# Patient Record
Sex: FEMALE | Race: WHITE | Employment: FULL TIME | URBAN - METROPOLITAN AREA
[De-identification: names, ages, dates, MRNs, and addresses within clinical notes are randomized per-mention and may not be internally consistent; named-entity substitution may affect disease eponyms.]

---

## 2021-01-12 ENCOUNTER — APPOINTMENT (OUTPATIENT)
Dept: ULTRASOUND IMAGING | Age: 31
End: 2021-01-12

## 2021-01-12 ENCOUNTER — HOSPITAL ENCOUNTER (EMERGENCY)
Age: 31
Discharge: HOME | End: 2021-01-13

## 2021-01-12 DIAGNOSIS — B37.31 VAGINAL YEAST INFECTION: ICD-10-CM

## 2021-01-12 DIAGNOSIS — Z34.91 FIRST TRIMESTER PREGNANCY: Primary | ICD-10-CM

## 2021-01-12 DIAGNOSIS — O26.891 ABDOMINAL PAIN IN PREGNANCY, FIRST TRIMESTER: ICD-10-CM

## 2021-01-12 DIAGNOSIS — R10.9 ABDOMINAL PAIN IN PREGNANCY, FIRST TRIMESTER: ICD-10-CM

## 2021-01-12 LAB
ABO/RH: NORMAL
ANION GAP SERPL CALCULATED.3IONS-SCNC: 13 MMOL/L (ref 3–16)
ANISOCYTOSIS: ABNORMAL
BACTERIA WET PREP: ABNORMAL
BACTERIA: ABNORMAL /HPF
BASOPHILS ABSOLUTE: 0 K/UL (ref 0–0.2)
BASOPHILS RELATIVE PERCENT: 0.5 %
BILIRUBIN URINE: NEGATIVE
BLOOD, URINE: NEGATIVE
BUN BLDV-MCNC: 8 MG/DL (ref 7–20)
CALCIUM SERPL-MCNC: 9.2 MG/DL (ref 8.3–10.6)
CHLORIDE BLD-SCNC: 102 MMOL/L (ref 99–110)
CLARITY: ABNORMAL
CLUE CELLS: ABNORMAL
CO2: 21 MMOL/L (ref 21–32)
COLOR: YELLOW
CREAT SERPL-MCNC: <0.5 MG/DL (ref 0.6–1.1)
EOSINOPHILS ABSOLUTE: 0.1 K/UL (ref 0–0.6)
EOSINOPHILS RELATIVE PERCENT: 0.8 %
EPITHELIAL CELLS WET PREP: ABNORMAL
EPITHELIAL CELLS, UA: 7 /HPF (ref 0–5)
GFR AFRICAN AMERICAN: >60
GFR NON-AFRICAN AMERICAN: >60
GLUCOSE BLD-MCNC: 84 MG/DL (ref 70–99)
GLUCOSE URINE: NEGATIVE MG/DL
GONADOTROPIN, CHORIONIC (HCG) QUANT: NORMAL MIU/ML
HCT VFR BLD CALC: 29.6 % (ref 36–48)
HEMATOLOGY PATH CONSULT: YES
HEMOGLOBIN: 8.7 G/DL (ref 12–16)
HYALINE CASTS: 1 /LPF (ref 0–8)
HYPOCHROMIA: ABNORMAL
KETONES, URINE: NEGATIVE MG/DL
LEUKOCYTE ESTERASE, URINE: NEGATIVE
LYMPHOCYTES ABSOLUTE: 1.6 K/UL (ref 1–5.1)
LYMPHOCYTES RELATIVE PERCENT: 21 %
MCH RBC QN AUTO: 18.8 PG (ref 26–34)
MCHC RBC AUTO-ENTMCNC: 29.6 G/DL (ref 31–36)
MCV RBC AUTO: 63.7 FL (ref 80–100)
MICROCYTES: ABNORMAL
MICROSCOPIC EXAMINATION: YES
MONOCYTES ABSOLUTE: 0.5 K/UL (ref 0–1.3)
MONOCYTES RELATIVE PERCENT: 6.9 %
NEUTROPHILS ABSOLUTE: 5.3 K/UL (ref 1.7–7.7)
NEUTROPHILS RELATIVE PERCENT: 70.8 %
NITRITE, URINE: NEGATIVE
OVALOCYTES: ABNORMAL
PDW BLD-RTO: 18 % (ref 12.4–15.4)
PH UA: 7.5 (ref 5–8)
PLATELET # BLD: 342 K/UL (ref 135–450)
PMV BLD AUTO: 8.3 FL (ref 5–10.5)
POLYCHROMASIA: ABNORMAL
POTASSIUM REFLEX MAGNESIUM: 4 MMOL/L (ref 3.5–5.1)
PROTEIN UA: NEGATIVE MG/DL
RBC # BLD: 4.64 M/UL (ref 4–5.2)
RBC UA: 1 /HPF (ref 0–4)
RBC WET PREP: ABNORMAL
SODIUM BLD-SCNC: 136 MMOL/L (ref 136–145)
SOURCE WET PREP: ABNORMAL
SPECIFIC GRAVITY UA: 1.02 (ref 1–1.03)
TRICHOMONAS PREP: ABNORMAL
URINE REFLEX TO CULTURE: ABNORMAL
URINE TYPE: ABNORMAL
UROBILINOGEN, URINE: 0.2 E.U./DL
WBC # BLD: 7.5 K/UL (ref 4–11)
WBC UA: 4 /HPF (ref 0–5)
WBC WET PREP: ABNORMAL
YEAST WET PREP: ABNORMAL

## 2021-01-12 PROCEDURE — 84702 CHORIONIC GONADOTROPIN TEST: CPT

## 2021-01-12 PROCEDURE — 85025 COMPLETE CBC W/AUTO DIFF WBC: CPT

## 2021-01-12 PROCEDURE — 87591 N.GONORRHOEAE DNA AMP PROB: CPT

## 2021-01-12 PROCEDURE — 87491 CHLMYD TRACH DNA AMP PROBE: CPT

## 2021-01-12 PROCEDURE — 76801 OB US < 14 WKS SINGLE FETUS: CPT

## 2021-01-12 PROCEDURE — 86901 BLOOD TYPING SEROLOGIC RH(D): CPT

## 2021-01-12 PROCEDURE — 81001 URINALYSIS AUTO W/SCOPE: CPT

## 2021-01-12 PROCEDURE — 99283 EMERGENCY DEPT VISIT LOW MDM: CPT

## 2021-01-12 PROCEDURE — 80048 BASIC METABOLIC PNL TOTAL CA: CPT

## 2021-01-12 PROCEDURE — 86900 BLOOD TYPING SEROLOGIC ABO: CPT

## 2021-01-12 PROCEDURE — 6370000000 HC RX 637 (ALT 250 FOR IP): Performed by: NURSE PRACTITIONER

## 2021-01-12 PROCEDURE — 87210 SMEAR WET MOUNT SALINE/INK: CPT

## 2021-01-12 RX ORDER — ACETAMINOPHEN 500 MG
1000 TABLET ORAL ONCE
Status: COMPLETED | OUTPATIENT
Start: 2021-01-12 | End: 2021-01-12

## 2021-01-12 RX ADMIN — ACETAMINOPHEN 1000 MG: 500 TABLET ORAL at 22:30

## 2021-01-12 ASSESSMENT — PAIN SCALES - GENERAL
PAINLEVEL_OUTOF10: 7
PAINLEVEL_OUTOF10: 9

## 2021-01-12 ASSESSMENT — ENCOUNTER SYMPTOMS
SHORTNESS OF BREATH: 0
WHEEZING: 0
COLOR CHANGE: 0
ABDOMINAL PAIN: 1
COUGH: 0
BACK PAIN: 0
VOMITING: 0
DIARRHEA: 0
NAUSEA: 0

## 2021-01-12 ASSESSMENT — PAIN DESCRIPTION - PROGRESSION: CLINICAL_PROGRESSION: NOT CHANGED

## 2021-01-12 ASSESSMENT — PAIN DESCRIPTION - FREQUENCY: FREQUENCY: CONTINUOUS

## 2021-01-12 ASSESSMENT — PAIN DESCRIPTION - ONSET: ONSET: ON-GOING

## 2021-01-13 VITALS
OXYGEN SATURATION: 99 % | TEMPERATURE: 99 F | RESPIRATION RATE: 14 BRPM | DIASTOLIC BLOOD PRESSURE: 64 MMHG | HEART RATE: 84 BPM | SYSTOLIC BLOOD PRESSURE: 116 MMHG

## 2021-01-13 LAB
C TRACH DNA GENITAL QL NAA+PROBE: NEGATIVE
HEMATOLOGY PATH CONSULT: NORMAL
N. GONORRHOEAE DNA: NEGATIVE

## 2021-01-13 RX ORDER — .ALPHA.-TOCOPHEROL ACETATE, DL-, CHOLECALCIFEROL, CYANOCOBALAMIN, FERROUS FUMARATE, FOLIC ACID, NIACINAMIDE, SODIUM ASCORBATE, ASCORBIC ACID, PYRIDOXINE HYDROCHLORIDE, RIBOFLAVIN, AND THIAMINE MONONITRATE 11; 400; 12; 29; 1; 20; 60; 60; 10; 3; 2 [IU]/1; [IU]/1; UG/1; MG/1; MG/1; MG/1; MG/1; MG/1; MG/1; MG/1; MG/1
1 TABLET, CHEWABLE ORAL DAILY
Qty: 30 TABLET | Refills: 0 | Status: SHIPPED | OUTPATIENT
Start: 2021-01-13

## 2021-01-13 RX ORDER — TIOCONAZOLE 6.5 %
OINTMENT WITH PREFILLED APPLICATOR VAGINAL
Qty: 1 KIT | Refills: 1 | Status: SHIPPED | OUTPATIENT
Start: 2021-01-13 | End: 2021-01-20

## 2021-01-13 NOTE — ED PROVIDER NOTES
**ADVANCED PRACTICE PROVIDER, I HAVE EVALUATED THIS PATIENT**        [address] [facility] [place]      Pt Name: Gisela Walter  PNI:[id number]  [place] 1990  Date of evaluation: 2021  Provider: STANTON Mcgovern CNP      Chief Complaint:    Chief Complaint   Patient presents with    Pelvic Pain     onset 2 weeks ago. pt states she is pregnant. last period 2020. pt with PCOS, Hx of endometriosis       Nursing Notes, Past Medical Hx, Past Surgical Hx, Social Hx, Allergies, and Family Hx were all reviewed and agreed with or any disagreements were addressed in the HPI.    HPI:  (Location, Duration, Timing, Severity, Quality, Assoc Sx, Context, Modifying factors)  This is a  31 y.o. female who presents today with LLQ abd pain that has been off and on the past two weeks but has been constant today. She states that her pain started getting worse yesterday. She states that she is not having heavy bleeding or discharge, denies passing clots. She states that she is G-12, P-2, A-9 (1 ectopic and then 8 miscarriages). She states her LMP 20. He does report history of PCOS and endometriosis. Rates the abdominal pain a 7 out of 10. She denies any urinary symptoms. Does have some slight nausea but no vomiting or diarrhea. She denies any cough, congestion, fever or chills. No chest pain put of chest pain or shortness of breath. Is not take any medicine for symptoms. She denies any additional complaints, no additional aggravating or relieving factors. Patient presents awake, alert and in no acute distress or toxic appearance.     PastMedical/Surgical History:      Diagnosis Date    Anxiety     Childhood asthma     Tobacco dependence          Procedure Laterality Date     SECTION      INDUCED          Medications:  Previous Medications    ESCITALOPRAM (LEXAPRO) 10 MG TABLET    Take 1 tablet by mouth daily LEVOTHYROXINE (SYNTHROID) 50 MCG TABLET    Take 50 mcg by mouth Daily    METFORMIN (GLUCOPHAGE) 850 MG TABLET    Take 850 mg by mouth 2 times daily (with meals)    SERTRALINE (ZOLOFT) 50 MG TABLET    Take 1 tablet by mouth daily    VARENICLINE (CHANTIX) 1 MG TABLET    Take 1 mg by mouth 2 times daily         Review of Systems:  Review of Systems   Constitutional: Negative for chills and fever. HENT: Negative for congestion. Respiratory: Negative for cough, shortness of breath and wheezing. Cardiovascular: Negative for chest pain. Gastrointestinal: Positive for abdominal pain. Negative for diarrhea, nausea and vomiting. Patient complains of suprapubic abdominal cramping and pelvic discomfort for the past 2 weeks, states been off and on, she states that she is currently pregnant and has had an ectopic pregnancy in the past.  She denies any vaginal bleeding or discharge, no concern for STD. Genitourinary: Positive for pelvic pain. Negative for difficulty urinating, dysuria and frequency. Patient complains of lower abdominal pain in pregnancy   Musculoskeletal: Negative for back pain. Skin: Negative for color change. Neurological: Negative for weakness, numbness and headaches. Positives and Pertinent negatives as per HPI. Except as noted above in the ROS, problem specific ROS was completed and is negative. Physical Exam:  Physical Exam  Vitals signs and nursing note reviewed. Constitutional:       Appearance: She is well-developed. She is not diaphoretic. HENT:      Head: Normocephalic. Right Ear: External ear normal.      Left Ear: External ear normal.   Eyes:      General: No scleral icterus. Right eye: No discharge. Left eye: No discharge. Neck:      Musculoskeletal: Normal range of motion and neck supple. Cardiovascular:      Rate and Rhythm: Normal rate and regular rhythm.    Pulmonary: Effort: Pulmonary effort is normal. No respiratory distress. Breath sounds: Normal breath sounds. Abdominal:      Palpations: Abdomen is soft. Tenderness: There is abdominal tenderness. Comments: Abdomen is flat soft nondistended. Bowel sounds are hypoactive, patient has tenderness in the left lower quadrant of the abdomen but no acute ascites or rigidity. No rebound tenderness at McBurney's point. Musculoskeletal: Normal range of motion. Skin:     General: Skin is warm. Capillary Refill: Capillary refill takes less than 2 seconds. Coloration: Skin is not pale. Neurological:      General: No focal deficit present. Mental Status: She is alert and oriented to person, place, and time. GCS: GCS eye subscore is 4. GCS verbal subscore is 5. GCS motor subscore is 6.    Psychiatric:         Behavior: Behavior normal.         MEDICAL DECISION MAKING    Vitals:    Vitals:    01/12/21 1927   BP: 120/65   Pulse: 106   Resp: 16   Temp: 99 °F (37.2 °C)   TempSrc: Oral   SpO2: 99%       LABS:  Labs Reviewed   WET PREP, GENITAL - Abnormal; Notable for the following components:       Result Value    Yeast, Wet Prep 3+ (*)     Clue Cells, Wet Prep 1+ (*)     All other components within normal limits    Narrative:     Performed at:  [address] "[employer] [address]   Phone [phone]   CBC WITH AUTO DIFFERENTIAL - Abnormal; Notable for the following components:    Hemoglobin 8.7 (*)     Hematocrit 29.6 (*)     MCV 63.7 (*)     MCH 18.8 (*)     MCHC 29.6 (*)     RDW 18.0 (*)     Anisocytosis 1+ (*)     Microcytes 3+ (*)     Polychromasia Occasional (*)     Hypochromia 1+ (*)     Ovalocytes Occasional (*)     All other components within normal limits    Narrative:     Performed at:  [facility]  [address] [facility] "[employer] [address]   Phone [phone] BASIC METABOLIC PANEL W/ REFLEX TO MG FOR LOW K - Abnormal; Notable for the following components:    CREATININE <0.5 (*)     All other components within normal limits    Narrative:     Performed at:  [place] [address]   Phone [phone]   URINE RT REFLEX TO CULTURE - Abnormal; Notable for the following components:    Clarity, UA TURBID (*)     All other components within normal limits    Narrative:     Performed at:  [address] [address]   Phone [phone]   MICROSCOPIC URINALYSIS - Abnormal; Notable for the following components:    Bacteria, UA 1+ (*)     Epithelial Cells, UA 7 (*)     All other components within normal limits    Narrative:     Performed at:  [address] [address]   Phone [phone]   C. TRACHOMATIS N.GONORRHOEAE DNA   HCG, QUANTITATIVE, PREGNANCY    Narrative:     Performed at:  [place] [address]   Phone [phone]   ABO/RH    Narrative:     Performed at:  [facility]  [address] [place] [address]   Phone [phone] of labs reviewed and werenegative at this time or not returned at the time of this note. RADIOLOGY:   Non-plain film images such as CT, Ultrasound and MRI are read by the radiologist. Elizabeth HAJI, APRN - CNP have directly visualized the radiologic plain film image(s) with the below findings:        Interpretation per the Radiologist below, if available at the time of this note:    US OB LESS THAN 14 WEEKS SINGLE OR FIRST GESTATION   Final Result   Single viable intrauterine pregnancy with an estimated gestational age of 5   weeks. No gross abnormality is seen. Probable 2 cm minimally complex corpus luteum cyst in the right ovary. Nonvisualization of the left ovary due to overlying bowel gas with no adnexal   mass or free fluid              MEDICAL DECISION MAKING / ED COURSE:    Because of high probability of sudden clinical deterioration of the patient's condition and risk of further deterioration, critical care time required my full attention to the patient's condition; which included chart data review, documentation, medication ordering, reviewing the patient's old records, reevaluation patient's cardiac, pulmonary and neurological status. Reevaluation of vital signs. Consultations with ED attending and admitting physician. Ordering, interpreting reviewing diagnostic testing. Therefore a critical care time was 18 minutes of direct attention to the patient's condition did not include time spent on procedures. PROCEDURES:   Procedures    None    Patient was given:  Medications   acetaminophen (TYLENOL) tablet 1,000 mg (1,000 mg Oral Given 1/12/21 2230)       Patient complains of suprapubic abdominal cramping and pelvic discomfort for the past 2 weeks, states been off and on, she states that she is currently pregnant and has had an ectopic pregnancy in the past.  She denies any vaginal bleeding or discharge, no concern for STD. After evaluation and examination the patient IV access, blood work, urinalysis and transvaginal ultrasound were ordered. Urinalysis shows 1+ bacteria, most likely a contaminated sample, she had negative nitrates and minimal WBCs. CBC shows anemia with an H&H of 8.7 and 29.6, she does report that she is chronically anemic however she is not taking any additional's iron supplements. She is Rh+. Quantitative hCG is 120,269. Metabolic panel shows no electrolyte disturbances or renal insufficiency. Urinalysis shows 1+ bacteria, urine will be sent for culture review. However, at this time no concern for acute surgical abdomen, molar pregnancy, pyelonephritis, ectopic pregnancy or other emergent etiology. Therefore, shared medical decision was made to the patient myself we agreed the patient be discharged home with outpatient follow-up. Patient was discharged home with referral to OB/GYN, educated to call morning for an appointment. Discharged home with prescription for prenatal vitamins and to have repeat beta done in 48 hours. Return to the ER for worsening symptoms. Increasing her fluids and rest. The patient tolerated their visit well. I evaluated the patient. The physician was available for consultation as needed. The patient and / or the family were informed of the results of any tests, a time was given to answer questions, a plan was proposed and they agreed with plan. Patient verbalized understanding of discharge instructions and the patient was discharged from the department in stable condition. CLINICAL IMPRESSION:  1. First trimester pregnancy    2. Vaginal yeast infection    3. Abdominal pain in pregnancy, first trimester        DISPOSITION        PATIENT REFERRED TO:  [facility]  [address] [phone]  Schedule an appointment as soon as possible for a visit in 1 day

## 2021-01-14 DIAGNOSIS — R10.9 ABDOMINAL PAIN IN PREGNANCY, FIRST TRIMESTER: ICD-10-CM

## 2021-01-14 DIAGNOSIS — O26.891 ABDOMINAL PAIN IN PREGNANCY, FIRST TRIMESTER: ICD-10-CM

## 2021-01-14 DIAGNOSIS — B37.31 VAGINAL YEAST INFECTION: ICD-10-CM

## 2021-01-14 DIAGNOSIS — Z34.91 FIRST TRIMESTER PREGNANCY: ICD-10-CM

## 2021-01-14 LAB — GONADOTROPIN, CHORIONIC (HCG) QUANT: NORMAL MIU/ML
